# Patient Record
Sex: FEMALE | Race: ASIAN | URBAN - METROPOLITAN AREA
[De-identification: names, ages, dates, MRNs, and addresses within clinical notes are randomized per-mention and may not be internally consistent; named-entity substitution may affect disease eponyms.]

---

## 2020-04-28 ENCOUNTER — OFFICE VISIT (OUTPATIENT)
Dept: OBGYN CLINIC | Age: 30
End: 2020-04-28
Payer: COMMERCIAL

## 2020-04-28 VITALS
SYSTOLIC BLOOD PRESSURE: 100 MMHG | WEIGHT: 125 LBS | HEIGHT: 63 IN | BODY MASS INDEX: 22.15 KG/M2 | DIASTOLIC BLOOD PRESSURE: 70 MMHG

## 2020-04-28 DIAGNOSIS — N92.6 IRREGULAR MENSES: ICD-10-CM

## 2020-04-28 LAB — TSH REFLEX: 2.16 UIU/ML (ref 0.44–3.86)

## 2020-04-28 PROCEDURE — 99203 OFFICE O/P NEW LOW 30 MIN: CPT | Performed by: OBSTETRICS & GYNECOLOGY

## 2020-04-28 PROCEDURE — 81003 URINALYSIS AUTO W/O SCOPE: CPT | Performed by: OBSTETRICS & GYNECOLOGY

## 2020-04-28 PROCEDURE — 36415 COLL VENOUS BLD VENIPUNCTURE: CPT | Performed by: OBSTETRICS & GYNECOLOGY

## 2020-04-28 ASSESSMENT — PATIENT HEALTH QUESTIONNAIRE - PHQ9
SUM OF ALL RESPONSES TO PHQ QUESTIONS 1-9: 0
SUM OF ALL RESPONSES TO PHQ QUESTIONS 1-9: 0
1. LITTLE INTEREST OR PLEASURE IN DOING THINGS: 0
2. FEELING DOWN, DEPRESSED OR HOPELESS: 0
SUM OF ALL RESPONSES TO PHQ9 QUESTIONS 1 & 2: 0

## 2020-05-04 ENCOUNTER — TELEPHONE (OUTPATIENT)
Dept: OBGYN CLINIC | Age: 30
End: 2020-05-04

## 2020-05-04 RX ORDER — METRONIDAZOLE 500 MG/1
500 TABLET ORAL 2 TIMES DAILY
Qty: 14 TABLET | Refills: 0 | Status: SHIPPED | OUTPATIENT
Start: 2020-05-04 | End: 2020-05-11

## 2020-05-05 ENCOUNTER — TELEPHONE (OUTPATIENT)
Dept: OBGYN CLINIC | Age: 30
End: 2020-05-05

## 2020-05-07 ENCOUNTER — PATIENT MESSAGE (OUTPATIENT)
Dept: OBGYN CLINIC | Age: 30
End: 2020-05-07

## 2020-08-25 ENCOUNTER — OFFICE VISIT (OUTPATIENT)
Dept: OBGYN CLINIC | Age: 30
End: 2020-08-25
Payer: COMMERCIAL

## 2020-08-25 VITALS
WEIGHT: 123 LBS | BODY MASS INDEX: 21.79 KG/M2 | SYSTOLIC BLOOD PRESSURE: 100 MMHG | DIASTOLIC BLOOD PRESSURE: 78 MMHG | HEIGHT: 63 IN

## 2020-08-25 PROCEDURE — 99213 OFFICE O/P EST LOW 20 MIN: CPT | Performed by: OBSTETRICS & GYNECOLOGY

## 2020-08-25 NOTE — PROGRESS NOTES
SUBJECTIVE:   35 y.o.   female here to discuss irregular menses and no pregnancy for 2 yrs. Pt reports monthly menses but very irregular ranging form 28-35 days between menses. Pt has been using ovulation predictor kits and regular IC without pregnancy. PT with h/o EAB x 1. Pt interested in becoming more aggressing about pregnancy. PT reports previously using clomid and pt interested in trying that again. Review of Systems:  General ROS: negative  Respiratory ROS: no cough, shortness of breath, or wheezing  Cardiovascular ROS: no chest pain or dyspnea on exertion  Gastrointestinal ROS: no abdominal pain, change in bowel habits, or black or bloody stools  Genito-Urinary ROS: no dysuria, trouble voiding, or hematuria    OBJECTIVE:   /78   Ht 5' 3\" (1.6 m)   Wt 123 lb (55.8 kg)   LMP 2020   BMI 21.79 kg/m²     Physical Exam:  GEN: She appears well, afebrile. HEENT: normal cephalic, atraumatic  CVS: regular rate and rhythm  ABDOMEN: benign, soft, nontender, no masses. MUSCULOSKELETAL: normal gait, no masses  SKIN: normal texture and tone, no lesions    ASSESSMENT:   Metrorrhagia    PLAN:   Risks, benefits and alternative therapies for therapy discusses. Pt agrees to trial of clomid.    Rx clomid  Referral to [facility] for further evaluation  Pt to return for annual exam

## 2020-08-28 ENCOUNTER — TELEPHONE (OUTPATIENT)
Dept: OBGYN CLINIC | Age: 30
End: 2020-08-28

## 2020-08-28 NOTE — TELEPHONE ENCOUNTER
Pt stated she needed her referral faxed to [facility] instead of [facility] for infertility issues